# Patient Record
Sex: MALE | Race: WHITE | NOT HISPANIC OR LATINO | Employment: UNEMPLOYED | URBAN - METROPOLITAN AREA
[De-identification: names, ages, dates, MRNs, and addresses within clinical notes are randomized per-mention and may not be internally consistent; named-entity substitution may affect disease eponyms.]

---
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
## 2023-09-12 ENCOUNTER — HOSPITAL ENCOUNTER (EMERGENCY)
Facility: HOSPITAL | Age: 18
Discharge: HOME | End: 2023-09-12
Attending: INTERNAL MEDICINE

## 2023-09-12 VITALS
BODY MASS INDEX: 22.22 KG/M2 | WEIGHT: 150 LBS | OXYGEN SATURATION: 96 % | HEIGHT: 69 IN | RESPIRATION RATE: 17 BRPM | HEART RATE: 83 BPM | DIASTOLIC BLOOD PRESSURE: 83 MMHG | SYSTOLIC BLOOD PRESSURE: 133 MMHG | TEMPERATURE: 99 F

## 2023-09-12 DIAGNOSIS — K62.5 RECTAL BLEEDING: ICD-10-CM

## 2023-09-12 DIAGNOSIS — K59.00 CONSTIPATION, UNSPECIFIED CONSTIPATION TYPE: Primary | ICD-10-CM

## 2023-09-12 DIAGNOSIS — R10.9 ABDOMINAL PAIN: ICD-10-CM

## 2023-09-12 DIAGNOSIS — R10.84 GENERALIZED ABDOMINAL PAIN: ICD-10-CM

## 2023-09-12 LAB
ALBUMIN SERPL-MCNC: 4.9 G/DL (ref 3.5–5)
ALBUMIN/GLOB SERPL: 1.5 RATIO (ref 1.1–2)
ALP SERPL-CCNC: 75 UNIT/L
ALT SERPL-CCNC: 18 UNIT/L (ref 0–55)
APPEARANCE UR: CLEAR
AST SERPL-CCNC: 25 UNIT/L (ref 5–34)
BACTERIA #/AREA URNS AUTO: ABNORMAL /HPF
BASOPHILS # BLD AUTO: 0.03 X10(3)/MCL
BASOPHILS NFR BLD AUTO: 0.4 %
BILIRUB SERPL-MCNC: 0.5 MG/DL
BILIRUB UR QL STRIP.AUTO: ABNORMAL
BUN SERPL-MCNC: 11.5 MG/DL (ref 8.9–20.6)
CALCIUM SERPL-MCNC: 10.2 MG/DL (ref 8.4–10.2)
CAOX CRY URNS QL MICRO: ABNORMAL /HPF
CHLORIDE SERPL-SCNC: 103 MMOL/L (ref 98–107)
CO2 SERPL-SCNC: 27 MMOL/L (ref 22–29)
COLOR UR: ABNORMAL
CREAT SERPL-MCNC: 1.19 MG/DL (ref 0.73–1.18)
EOSINOPHIL # BLD AUTO: 0.21 X10(3)/MCL (ref 0–0.9)
EOSINOPHIL NFR BLD AUTO: 2.7 %
ERYTHROCYTE [DISTWIDTH] IN BLOOD BY AUTOMATED COUNT: 11 % (ref 11.5–17)
GFR SERPLBLD CREATININE-BSD FMLA CKD-EPI: >60 MLS/MIN/1.73/M2
GLOBULIN SER-MCNC: 3.3 GM/DL (ref 2.4–3.5)
GLUCOSE SERPL-MCNC: 95 MG/DL (ref 74–100)
GLUCOSE UR QL STRIP.AUTO: NEGATIVE
HCT VFR BLD AUTO: 42.7 % (ref 42–52)
HEMOCCULT SP1 STL QL: NEGATIVE
HGB BLD-MCNC: 15.2 G/DL (ref 14–18)
IMM GRANULOCYTES # BLD AUTO: 0.01 X10(3)/MCL (ref 0–0.04)
IMM GRANULOCYTES NFR BLD AUTO: 0.1 %
KETONES UR QL STRIP.AUTO: ABNORMAL
LEUKOCYTE ESTERASE UR QL STRIP.AUTO: NEGATIVE
LYMPHOCYTES # BLD AUTO: 2.97 X10(3)/MCL (ref 0.6–4.6)
LYMPHOCYTES NFR BLD AUTO: 38.4 %
MAGNESIUM SERPL-MCNC: 2.2 MG/DL (ref 1.7–2.2)
MCH RBC QN AUTO: 32.1 PG (ref 27–31)
MCHC RBC AUTO-ENTMCNC: 35.6 G/DL (ref 33–36)
MCV RBC AUTO: 90.3 FL (ref 80–94)
MONOCYTES # BLD AUTO: 0.46 X10(3)/MCL (ref 0.1–1.3)
MONOCYTES NFR BLD AUTO: 5.9 %
MUCOUS THREADS URNS QL MICRO: ABNORMAL /LPF
NEUTROPHILS # BLD AUTO: 4.06 X10(3)/MCL (ref 2.1–9.2)
NEUTROPHILS NFR BLD AUTO: 52.5 %
NITRITE UR QL STRIP.AUTO: NEGATIVE
NRBC BLD AUTO-RTO: 0 %
PH UR STRIP.AUTO: 6 [PH]
PLATELET # BLD AUTO: 256 X10(3)/MCL (ref 130–400)
PMV BLD AUTO: 9.4 FL (ref 7.4–10.4)
POTASSIUM SERPL-SCNC: 3.8 MMOL/L (ref 3.5–5.1)
PROT SERPL-MCNC: 8.2 GM/DL (ref 6.4–8.3)
PROT UR QL STRIP.AUTO: ABNORMAL
RBC # BLD AUTO: 4.73 X10(6)/MCL (ref 4.7–6.1)
RBC #/AREA URNS AUTO: ABNORMAL /HPF
RBC UR QL AUTO: NEGATIVE
SODIUM SERPL-SCNC: 143 MMOL/L (ref 136–145)
SP GR UR STRIP.AUTO: >=1.03 (ref 1–1.03)
SQUAMOUS #/AREA URNS AUTO: ABNORMAL /HPF
UROBILINOGEN UR STRIP-ACNC: 1
WBC # SPEC AUTO: 7.74 X10(3)/MCL (ref 4.5–11.5)
WBC #/AREA URNS AUTO: ABNORMAL /HPF

## 2023-09-12 PROCEDURE — 85025 COMPLETE CBC W/AUTO DIFF WBC: CPT

## 2023-09-12 PROCEDURE — 80053 COMPREHEN METABOLIC PANEL: CPT

## 2023-09-12 PROCEDURE — 99284 EMERGENCY DEPT VISIT MOD MDM: CPT

## 2023-09-12 PROCEDURE — 83735 ASSAY OF MAGNESIUM: CPT

## 2023-09-12 PROCEDURE — 81001 URINALYSIS AUTO W/SCOPE: CPT

## 2023-09-12 PROCEDURE — 82270 OCCULT BLOOD FECES: CPT | Performed by: INTERNAL MEDICINE

## 2023-09-12 RX ORDER — DOCUSATE SODIUM 100 MG/1
100 CAPSULE, LIQUID FILLED ORAL 3 TIMES DAILY PRN
Qty: 60 CAPSULE | Refills: 0 | Status: SHIPPED | OUTPATIENT
Start: 2023-09-12

## 2023-09-12 NOTE — ED TRIAGE NOTES
"Pt complaint of recurrent episodes of  bright red blood seen after a BM and recurrent constipation over the past four years. Pt relates with recent occurrence 3 days ago with "bright red blood" and hard stools seen at BM  "

## 2023-09-12 NOTE — DISCHARGE INSTRUCTIONS
Take Lactulose and Colace TID for constipation. When you feel the need to have a bowel movement, attempt to use the restroom. Use enema at home as needed.     Follow up with PCP for continued management.

## 2023-09-12 NOTE — ED PROVIDER NOTES
"Encounter Date: 9/12/2023       History     Chief Complaint   Patient presents with    Rectal Bleeding     Pt complaint of recurrent episodes of  bright red blood seen after a BM and recurrent constipation over the past four years. Pt relates with recent occurrence 3 days ago with "bright red blood" and hard stools seen at BM     See MDM for details         Review of patient's allergies indicates:   Allergen Reactions    Penicillins      Other reaction(s): rash     Past Medical History:   Diagnosis Date    Asthma      Past Surgical History:   Procedure Laterality Date    TONSILLECTOMY       No family history on file.  Social History     Tobacco Use    Smoking status: Every Day     Types: Cigarettes, Vaping w/o nicotine    Smokeless tobacco: Never   Substance Use Topics    Alcohol use: Not Currently     Review of Systems   Constitutional:  Negative for chills and fever.   Gastrointestinal:  Positive for abdominal pain, anal bleeding, constipation and rectal pain. Negative for abdominal distention, diarrhea, nausea and vomiting.   Genitourinary:  Negative for difficulty urinating, dysuria and flank pain.   Musculoskeletal:  Negative for back pain.   Skin:  Negative for rash.   All other systems reviewed and are negative.      Physical Exam     Initial Vitals [09/12/23 1418]   BP Pulse Resp Temp SpO2   119/89 100 18 98.6 °F (37 °C) 97 %      MAP       --         Physical Exam    Nursing note and vitals reviewed.  Constitutional: He appears well-developed and well-nourished.   Anxious.   HENT:   Head: Normocephalic and atraumatic.   Eyes: EOM are normal.   Cardiovascular:  Normal rate, regular rhythm and normal heart sounds.           Pulmonary/Chest: Breath sounds normal. No respiratory distress.   Abdominal: Bowel sounds are normal. He exhibits no distension. There is abdominal tenderness. There is no rebound and no guarding.   Genitourinary:    Genitourinary Comments: Anorectal exam performed by Dr. Harris per " patient request. Hard stool palpated.       Neurological: He is alert and oriented to person, place, and time.   Skin: Skin is warm and dry.   Psychiatric: He has a normal mood and affect.         ED Course   Procedures  Labs Reviewed   COMPREHENSIVE METABOLIC PANEL - Abnormal; Notable for the following components:       Result Value    Creatinine 1.19 (*)     All other components within normal limits   URINALYSIS, REFLEX TO URINE CULTURE - Abnormal; Notable for the following components:    Protein, UA Trace (*)     Ketones, UA Trace (*)     Bilirubin, UA Small (*)     All other components within normal limits   CBC WITH DIFFERENTIAL - Abnormal; Notable for the following components:    MCH 32.1 (*)     RDW 11.0 (*)     All other components within normal limits   URINALYSIS, MICROSCOPIC - Abnormal; Notable for the following components:    Bacteria, UA Few (*)     Mucous, UA Moderate (*)     Calcium Oxalate Crystals, UA Few (*)     Squamous Epithelial Cells, UA Few (*)     All other components within normal limits   MAGNESIUM - Normal   OCCULT BLOOD STOOL, CA SCREEN - Normal   CBC W/ AUTO DIFFERENTIAL    Narrative:     The following orders were created for panel order CBC Auto Differential.  Procedure                               Abnormality         Status                     ---------                               -----------         ------                     CBC with Differential[[id number]]        Abnormal            Final result                 Please view results for these tests on the individual orders.   OCCULT BLOOD,STOOL,SCREEN (1-3)    Narrative:     The following orders were created for panel order Occult Blood, Stool Screening (1 -3).  Procedure                               Abnormality         Status                     ---------                               -----------         ------                     Occult Blood Stool, CA S...[[id number]]  Normal              Final result               OCCULT BLOOD  STOOL, CA S...[[id number]]                                                   Please view results for these tests on the individual orders.   OCCULT BLOOD STOOL, CA SCREEN 2ND SPEC          Imaging Results              X-Ray Abdomen Flat And Erect (Final result)  Result time 09/12/23 16:23:59      Final result by Abhijeet Goldman MD (09/12/23 16:23:59)                   Impression:      Large amount of stool in the colon.      Electronically signed by: Abhijeet Goldman  Date:    09/12/2023  Time:    16:23               Narrative:    EXAMINATION:  XR ABDOMEN FLAT AND ERECT    CLINICAL HISTORY:  Unspecified abdominal pain    COMPARISON:  None    FINDINGS:  Flat and upright views of the abdomen demonstrate a nonspecific, nonobstructive bowel gas pattern.  There is no free air.  There is a large amount of stool in the colon.                                    X-Rays:   Independently Interpreted Readings:   Other Readings:  Abdominal flat & erect with large amount of stool present    Medications - No data to display  Medical Decision Making  17 year old male presents with worsening constipation and rectal bleeding x 3 days. Patient reports a history of chronic constipation for ~5 years. Patient reports that he is very fearful of having a bowel movement due to pain and bleeding in the past so he will wait days-1 week prior to attempting to have a bowel movement. He reports he has had this behavior for many years. The patient reports that 3 days ago he had one of the largest and hardest bowel movements he has experienced which was followed by heavy rectal bleeding. He reports that the bleeding was bright red. He reports his last BM was 3 days ago. He is currently experiencing BM urgency with some intermittent abdominal cramping but unable to go due to fear.     The patient reports that he will use OTC stool softeners or laxatives with little relief due to continued avoidance of using the bathroom. The patient denies any external  trauma to the rectum. He reports that he has a history of internal hemorrhoids which he has been treated at home as needed. He denies any external hemorrhoids to his knowledge. He denies any follow up for this problem. He reports that he sought medical care previously at age 13 but never had any follow up. He denies any previous colonoscopies, although recommended by previous provider.    Lab work and imaging ordered and reviewed. Rectal exam performed by Dr. Harris for patient's comfort-- hard stool present on exam. Will discharge home with enema and prescription lactulose and colace for relief. Recommended patient take medication as prescribed and use restroom when urge is felt. Recommend PCP follow up for continued management.    Amount and/or Complexity of Data Reviewed  Labs: ordered.     Details: Stable H/H. No electrolyte abnl.   Radiology: ordered.     Details: 2V abdomen with large amount of stool    Risk  OTC drugs.  Prescription drug management.      Additional MDM:   Differential Diagnosis:   Other: The following diagnoses were also considered and will be evaluated: hemorrhoid, anal fissure and constipation.                            Clinical Impression:   Final diagnoses:  [R10.9] Abdominal pain  [K59.00] Constipation, unspecified constipation type (Primary)  [K62.5] Rectal bleeding  [R10.84] Generalized abdominal pain        ED Disposition Condition    Discharge Stable          ED Prescriptions       Medication Sig Dispense Start Date End Date Auth. Provider    lactulose (CEPHULAC) 20 gram Pack () Take 1 packet (20 g total) by mouth 3 (three) times daily. for 7 days 21 packet 2023 Odette Farley PA    docusate sodium (COLACE) 100 MG capsule Take 1 capsule (100 mg total) by mouth 3 (three) times daily as needed for Constipation. 60 capsule 2023 -- Odette Farley PA          Follow-up Information       Follow up With Specialties Details Why Contact Info    Primary Care   Call  [phone] to get PCP if you do not have one Call [phone] to make primary care follow up             Odette Farley PA  09/12/23 1709       Odette Farlye PA  09/30/23 1254

## 2023-12-11 ENCOUNTER — NURSE TRIAGE (OUTPATIENT)
Dept: ADMINISTRATIVE | Facility: CLINIC | Age: 18
End: 2023-12-11

## 2023-12-12 NOTE — TELEPHONE ENCOUNTER
Pt's significant other reports pt has been dealing with trouble having BMs, also has 2 bulges on his lower stomach, one on each side. Has been about 3 weeks since his last BM. He has gone to an ED before where an enema was given, but pt has not had any results. Pt advised to be seen within 4 hours per protocol via UC/ED, S.O./Pt encouraged to call back with any worsening symptoms or questions. They verbalized understanding.    Reason for Disposition   [1] Rectal pain or fullness from fecal impaction (rectum full of stool) AND [2] NOT better after SITZ bath, suppository or enema    Additional Information   Negative: [1] Vomiting AND [2] contains bile (green color)   Negative: Patient sounds very sick or weak to the triager   Negative: [1] Vomiting AND [2] abdomen looks much more swollen than usual   Negative: [1] Constant abdominal pain AND [2] present > 2 hours    Protocols used: Constipation-A-AH